# Patient Record
Sex: MALE | Race: WHITE | Employment: FULL TIME | URBAN - METROPOLITAN AREA
[De-identification: names, ages, dates, MRNs, and addresses within clinical notes are randomized per-mention and may not be internally consistent; named-entity substitution may affect disease eponyms.]

---

## 2022-11-30 ENCOUNTER — HOSPITAL ENCOUNTER (OUTPATIENT)
Dept: NON INVASIVE DIAGNOSTICS | Age: 37
Discharge: HOME | End: 2022-11-30

## 2022-11-30 ENCOUNTER — HOSPITAL ENCOUNTER (OUTPATIENT)
Dept: GENERAL RADIOLOGY | Age: 37
Discharge: HOME | End: 2022-12-02

## 2022-11-30 VITALS — BODY MASS INDEX: 25.34 KG/M2 | WEIGHT: 181 LBS | HEIGHT: 71 IN

## 2022-11-30 DIAGNOSIS — Z02.1 PRE-EMPLOYMENT HEALTH SCREENING EXAMINATION: ICD-10-CM

## 2022-11-30 DIAGNOSIS — Z02.1 PRE-EMPLOYMENT EXAMINATION: ICD-10-CM

## 2022-11-30 PROCEDURE — 71046 X-RAY EXAM CHEST 2 VIEWS: CPT

## 2022-11-30 PROCEDURE — 93017 CV STRESS TEST TRACING ONLY: CPT

## 2022-11-30 NOTE — PROCEDURES
[address]                      [address], [address] [facility]                              CARDIAC STRESS TEST    PATIENT NAME: Bert Seals                    :        1985  MED REC NO:   [id number]                            ROOM:  ACCOUNT NO:   [de-identified]                           ADMIT DATE: 2022  PROVIDER:     Kenneth aBtista DO    CARDIOVASCULAR DIAGNOSTIC DEPARTMENT    DATE OF STUDY:  2022    TREADMILL STRESS EKG    ORDERING PROVIDER:  Sabas Dennis MD    REASON FOR EXAM:  Pre-employment. DESCRIPTION OF PROCEDURE:  The patient was exercised on modified Lionel  protocol for 15 minutes and 30 seconds achieving 18.6 METs of activity. Resting heart rate of 95 and maximum heart rate of 184, which represents  100% of the maximum age-predicted heart rate. Resting blood pressure  was 130/63 with a maximum blood pressure of 174/88. Baseline EKG showed normal sinus rhythm with no evidence of ischemia. EKG at peak exercise as well as in recovery did not show any significant  changes from baseline. There was no evidence of any malignant tachy or  bradyarrhythmias or any conduction abnormalities. IMPRESSION:  1. Negative maximal treadmill stress EKG. 2.  No ischemic EKG changes were seen. 3.  Excellent functional capacity for age. 4.  Normal hemodynamic response to exercise. 5.  No malignant tachy or bradyarrhythmias. 6.  No reported symptoms.         Belle Howard DO    D: 2022 #16:08:18       T: 2022 17:57:01     [email or web address]  Job#: [id number]     Doc#: [id number]    CC:

## 2022-11-30 NOTE — PROGRESS NOTES
Hx,allergies and medications reviewed. Procedure explained and informed consent obtained. Tolerated treadmill well for 15:30 minutes. Reached 100 % target HR. SOB noted. Returned to baseline in recovery. Denies chest pain or pressure. EKG shows no noted ectopy. Doctor to further review and interpret results.